# Patient Record
Sex: MALE | Race: WHITE
[De-identification: names, ages, dates, MRNs, and addresses within clinical notes are randomized per-mention and may not be internally consistent; named-entity substitution may affect disease eponyms.]

---

## 2022-11-28 ENCOUNTER — HOSPITAL ENCOUNTER (OUTPATIENT)
Dept: HOSPITAL 65 - RAD | Age: 52
Discharge: HOME | End: 2022-11-28
Attending: NURSE PRACTITIONER
Payer: COMMERCIAL

## 2022-11-28 DIAGNOSIS — Z00.00: ICD-10-CM

## 2022-11-28 DIAGNOSIS — M47.816: Primary | ICD-10-CM

## 2022-11-28 DIAGNOSIS — M47.814: ICD-10-CM

## 2022-11-28 PROCEDURE — 72100 X-RAY EXAM L-S SPINE 2/3 VWS: CPT

## 2022-11-28 PROCEDURE — 72072 X-RAY EXAM THORAC SPINE 3VWS: CPT

## 2022-11-28 NOTE — DIREP
PROCEDURE:XRAY SPINE THORACIC 3 VWS

 

COMPARISON:None.

 

INDICATIONS:Z00.00 HISTORY AND PHYSICAL EVALUATION

 

TECHNIQUE:AP & lateral views of the thoracic spine and a swimmer's view of 

the cervicothoracic junction are provided.

 

FINDINGS:

ALIGNMENT:Mild increased kyphosis from multilevel disc space narrowing

VERTEBRAE:Moderate anterior disc osteophyte complexes without acute fracture.

DISK SPACES:Significant multilevel anterior disc space narrowing most 

pronounced about the mid upper thoracic spine.

OTHER:Normal.

 

CONCLUSION:Multilevel degenerative changes as detailed above.  No acute 

fracture. 

 

 

Dictated by: Rich Bowie DO on 11/28/2022 at 06:17 PM     

Electronically Signed By: Rich Bowie DO on 11/28/2022 at 06:18 PM

## 2022-11-28 NOTE — DIREP
PROCEDURE:XRAY SPINE LUMBAR 2-3 VWS

 

COMPARISON:None.

 

INDICATIONS:Z00.00 HISTORY AND PHYSICAL EVALUATION

 

TECHNIQUE:AP, lateral, and coned down lateral views of the lumbar spine are 

provided.  

 

FINDINGS:

ALIGNMENT:Normal.

VERTEBRAE:Normal.

DISK SPACES:Disc degenerative change with moderate osteophyte formation.  

Facet sclerosis and hypertrophy, predominantly L5-S1, lesser amounts at L4-5

SPONDYLOLISTHESIS:None.

SACROILIAC JOINTS:Normal.

OTHER:Normal.

 

CONCLUSION:Degenerative changes.  No acute findings

 

 

 

 

Dictated by: Bryan Guzman MD on 11/28/2022 at 06:22 PM     

Electronically Signed By: Bryan Guzman MD on 11/28/2022 at 06:23 PM